# Patient Record
Sex: FEMALE | Race: WHITE | Employment: UNEMPLOYED | URBAN - NONMETROPOLITAN AREA
[De-identification: names, ages, dates, MRNs, and addresses within clinical notes are randomized per-mention and may not be internally consistent; named-entity substitution may affect disease eponyms.]

---

## 2017-06-27 ENCOUNTER — HOSPITAL ENCOUNTER (OUTPATIENT)
Dept: PAIN MANAGEMENT | Age: 37
Discharge: HOME | End: 2017-06-27
Payer: MEDICAID

## 2017-06-27 VITALS
WEIGHT: 174 LBS | RESPIRATION RATE: 18 BRPM | HEIGHT: 71 IN | TEMPERATURE: 98.5 F | BODY MASS INDEX: 24.36 KG/M2 | OXYGEN SATURATION: 95 % | HEART RATE: 73 BPM | DIASTOLIC BLOOD PRESSURE: 76 MMHG | SYSTOLIC BLOOD PRESSURE: 104 MMHG

## 2017-06-27 PROCEDURE — 99205 OFFICE O/P NEW HI 60 MIN: CPT

## 2017-06-27 RX ORDER — MELOXICAM 7.5 MG/1
7.5 TABLET ORAL DAILY
Qty: 30 TABLET | Refills: 2 | Status: SHIPPED | OUTPATIENT
Start: 2017-06-27 | End: 2017-08-30 | Stop reason: SDUPTHER

## 2017-06-27 RX ORDER — HYDROCODONE BITARTRATE AND ACETAMINOPHEN 10; 325 MG/1; MG/1
1 TABLET ORAL 3 TIMES DAILY PRN
Refills: 0 | COMMUNITY
Start: 2017-06-06 | End: 2017-06-27 | Stop reason: SDUPTHER

## 2017-06-27 RX ORDER — PROMETHAZINE HYDROCHLORIDE 25 MG/1
25 TABLET ORAL EVERY 6 HOURS PRN
COMMUNITY

## 2017-06-27 RX ORDER — OXCARBAZEPINE 300 MG/1
300 TABLET, FILM COATED ORAL 2 TIMES DAILY
COMMUNITY
End: 2020-03-07

## 2017-06-27 RX ORDER — ONDANSETRON 4 MG/1
4 TABLET, ORALLY DISINTEGRATING ORAL PRN
COMMUNITY
End: 2020-03-07

## 2017-06-27 RX ORDER — ESTRADIOL 1 MG/1
1 TABLET ORAL DAILY
COMMUNITY

## 2017-06-27 RX ORDER — ZIPRASIDONE HYDROCHLORIDE 40 MG/1
40 CAPSULE ORAL 2 TIMES DAILY WITH MEALS
COMMUNITY
End: 2020-03-07

## 2017-06-27 RX ORDER — HYDROCODONE BITARTRATE AND ACETAMINOPHEN 10; 325 MG/1; MG/1
1 TABLET ORAL 3 TIMES DAILY PRN
Qty: 90 TABLET | Refills: 0 | Status: SHIPPED | OUTPATIENT
Start: 2017-07-06 | End: 2017-08-03 | Stop reason: SDUPTHER

## 2017-06-27 RX ORDER — CYCLOBENZAPRINE HCL 10 MG
10 TABLET ORAL 2 TIMES DAILY PRN
COMMUNITY
End: 2020-03-07

## 2017-06-27 ASSESSMENT — PAIN SCALES - GENERAL: PAINLEVEL_OUTOF10: 7

## 2017-06-27 ASSESSMENT — PAIN DESCRIPTION - FREQUENCY: FREQUENCY: CONTINUOUS

## 2017-06-27 ASSESSMENT — PAIN DESCRIPTION - PROGRESSION: CLINICAL_PROGRESSION: NOT CHANGED

## 2017-06-27 ASSESSMENT — PAIN DESCRIPTION - DIRECTION: RADIATING_TOWARDS: RIGHT SHOULDER

## 2017-06-27 ASSESSMENT — PAIN DESCRIPTION - ORIENTATION: ORIENTATION: RIGHT

## 2017-06-27 ASSESSMENT — PAIN DESCRIPTION - PAIN TYPE: TYPE: CHRONIC PAIN

## 2017-06-27 ASSESSMENT — PAIN DESCRIPTION - ONSET: ONSET: ON-GOING

## 2017-06-27 ASSESSMENT — PAIN DESCRIPTION - LOCATION: LOCATION: NECK;SHOULDER

## 2017-08-03 RX ORDER — HYDROCODONE BITARTRATE AND ACETAMINOPHEN 10; 325 MG/1; MG/1
1 TABLET ORAL 3 TIMES DAILY PRN
Qty: 90 TABLET | Refills: 0 | Status: SHIPPED | OUTPATIENT
Start: 2017-08-05 | End: 2017-08-30 | Stop reason: SDUPTHER

## 2017-08-30 ENCOUNTER — HOSPITAL ENCOUNTER (OUTPATIENT)
Dept: PAIN MANAGEMENT | Age: 37
Discharge: HOME | End: 2017-08-30
Payer: MEDICAID

## 2017-08-30 VITALS
BODY MASS INDEX: 25.06 KG/M2 | TEMPERATURE: 98.2 F | OXYGEN SATURATION: 99 % | SYSTOLIC BLOOD PRESSURE: 99 MMHG | RESPIRATION RATE: 18 BRPM | HEIGHT: 71 IN | DIASTOLIC BLOOD PRESSURE: 67 MMHG | HEART RATE: 77 BPM | WEIGHT: 179 LBS

## 2017-08-30 PROCEDURE — 80307 DRUG TEST PRSMV CHEM ANLYZR: CPT

## 2017-08-30 PROCEDURE — 99214 OFFICE O/P EST MOD 30 MIN: CPT

## 2017-08-30 RX ORDER — MELOXICAM 7.5 MG/1
7.5 TABLET ORAL DAILY
Qty: 30 TABLET | Refills: 2 | Status: SHIPPED | OUTPATIENT
Start: 2017-08-30 | End: 2017-11-29 | Stop reason: SDUPTHER

## 2017-08-30 RX ORDER — HYDROCODONE BITARTRATE AND ACETAMINOPHEN 10; 325 MG/1; MG/1
1 TABLET ORAL 3 TIMES DAILY PRN
Qty: 90 TABLET | Refills: 0 | Status: SHIPPED | OUTPATIENT
Start: 2017-09-06 | End: 2017-09-20 | Stop reason: SDUPTHER

## 2017-08-30 ASSESSMENT — PAIN DESCRIPTION - FREQUENCY: FREQUENCY: CONTINUOUS

## 2017-08-30 ASSESSMENT — PAIN SCALES - GENERAL: PAINLEVEL_OUTOF10: 6

## 2017-08-30 ASSESSMENT — PAIN DESCRIPTION - PROGRESSION: CLINICAL_PROGRESSION: GRADUALLY WORSENING

## 2017-08-30 ASSESSMENT — PAIN DESCRIPTION - PAIN TYPE: TYPE: CHRONIC PAIN

## 2017-08-30 ASSESSMENT — ACTIVITIES OF DAILY LIVING (ADL): EFFECT OF PAIN ON DAILY ACTIVITIES: LIMITS ACTIVITIES

## 2017-08-30 ASSESSMENT — PAIN DESCRIPTION - ORIENTATION: ORIENTATION: LOWER

## 2017-08-30 ASSESSMENT — PAIN DESCRIPTION - ONSET: ONSET: ON-GOING

## 2017-08-30 ASSESSMENT — PAIN DESCRIPTION - LOCATION: LOCATION: NECK;HEAD

## 2017-09-07 LAB
AMPHETAMINES, URINE: NEGATIVE NG/ML
BARBITURATES, URINE: NEGATIVE NG/ML
BENZODIAZEPINES, URINE: NEGATIVE NG/ML
CANNABINOIDS, URINE: NEGATIVE NG/ML
COCAINE METABOLITE, URINE: NEGATIVE NG/ML
CODEINE, URINE: NEGATIVE
CREATININE, URINE: 63.9 MG/DL (ref 20–300)
ETHANOL U, QUAN: NEGATIVE %
FENTANYL URINE: NEGATIVE PG/ML
HYDROCODONE, UR CONF: 416 NG/ML
HYDROCODONE, URINE: POSITIVE
HYDROMORPHONE, URINE: NEGATIVE
MEPERIDINE, UR: NEGATIVE NG/ML
METHADONE SCREEN, URINE: NEGATIVE NG/ML
MORPHINE URINE: NEGATIVE
OPIATES, URINE: ABNORMAL NG/ML
OPIATES, URINE: POSITIVE NG/ML
OXYCODONE/OXYMORPHONE, UR: NEGATIVE NG/ML
PH, URINE: 6 (ref 4.5–8.9)
PHENCYCLIDINE, URINE: NEGATIVE NG/ML
PROPOXYPHENE, URINE: NEGATIVE NG/ML

## 2017-09-20 RX ORDER — HYDROCODONE BITARTRATE AND ACETAMINOPHEN 10; 325 MG/1; MG/1
1 TABLET ORAL 3 TIMES DAILY PRN
Qty: 90 TABLET | Refills: 0 | Status: SHIPPED | OUTPATIENT
Start: 2017-09-23 | End: 2017-10-04 | Stop reason: SDUPTHER

## 2017-10-04 ENCOUNTER — HOSPITAL ENCOUNTER (OUTPATIENT)
Dept: PAIN MANAGEMENT | Age: 37
Discharge: HOME | End: 2017-10-04
Payer: MEDICAID

## 2017-10-04 VITALS
OXYGEN SATURATION: 98 % | BODY MASS INDEX: 26.04 KG/M2 | DIASTOLIC BLOOD PRESSURE: 47 MMHG | HEIGHT: 71 IN | TEMPERATURE: 97.7 F | WEIGHT: 186 LBS | HEART RATE: 76 BPM | SYSTOLIC BLOOD PRESSURE: 95 MMHG | RESPIRATION RATE: 20 BRPM

## 2017-10-04 VITALS — DIASTOLIC BLOOD PRESSURE: 59 MMHG | SYSTOLIC BLOOD PRESSURE: 100 MMHG | OXYGEN SATURATION: 99 % | HEART RATE: 69 BPM

## 2017-10-04 DIAGNOSIS — M54.2 NECK PAIN: ICD-10-CM

## 2017-10-04 DIAGNOSIS — M47.812 CERVICAL FACET JOINT SYNDROME: Chronic | ICD-10-CM

## 2017-10-04 PROCEDURE — 64492 INJ PARAVERT F JNT C/T 3 LEV: CPT

## 2017-10-04 PROCEDURE — 64490 INJ PARAVERT F JNT C/T 1 LEV: CPT

## 2017-10-04 PROCEDURE — 80307 DRUG TEST PRSMV CHEM ANLYZR: CPT

## 2017-10-04 PROCEDURE — 99152 MOD SED SAME PHYS/QHP 5/>YRS: CPT

## 2017-10-04 PROCEDURE — 64491 INJ PARAVERT F JNT C/T 2 LEV: CPT

## 2017-10-04 PROCEDURE — 3209999900 FLUORO FOR SURGICAL PROCEDURES

## 2017-10-04 PROCEDURE — 6360000002 HC RX W HCPCS

## 2017-10-04 PROCEDURE — 2500000003 HC RX 250 WO HCPCS

## 2017-10-04 RX ORDER — TRIAMCINOLONE ACETONIDE 40 MG/ML
INJECTION, SUSPENSION INTRA-ARTICULAR; INTRAMUSCULAR
Status: COMPLETED | OUTPATIENT
Start: 2017-10-04 | End: 2017-10-04

## 2017-10-04 RX ORDER — HYDROCODONE BITARTRATE AND ACETAMINOPHEN 10; 325 MG/1; MG/1
1 TABLET ORAL 3 TIMES DAILY PRN
Qty: 90 TABLET | Refills: 0 | Status: SHIPPED | OUTPATIENT
Start: 2017-10-23 | End: 2017-10-04 | Stop reason: SDUPTHER

## 2017-10-04 RX ORDER — MIDAZOLAM HYDROCHLORIDE 1 MG/ML
INJECTION INTRAMUSCULAR; INTRAVENOUS
Status: COMPLETED | OUTPATIENT
Start: 2017-10-04 | End: 2017-10-04

## 2017-10-04 RX ORDER — HYDROCODONE BITARTRATE AND ACETAMINOPHEN 10; 325 MG/1; MG/1
1 TABLET ORAL 2 TIMES DAILY PRN
Qty: 60 TABLET | Refills: 0 | Status: SHIPPED | OUTPATIENT
Start: 2017-10-23 | End: 2017-11-20 | Stop reason: SDUPTHER

## 2017-10-04 RX ORDER — BUPIVACAINE HYDROCHLORIDE 5 MG/ML
INJECTION, SOLUTION EPIDURAL; INTRACAUDAL
Status: COMPLETED | OUTPATIENT
Start: 2017-10-04 | End: 2017-10-04

## 2017-10-04 RX ADMIN — TRIAMCINOLONE ACETONIDE 40 MG: 40 INJECTION, SUSPENSION INTRA-ARTICULAR; INTRAMUSCULAR at 09:02

## 2017-10-04 RX ADMIN — BUPIVACAINE HYDROCHLORIDE 9 ML: 5 INJECTION, SOLUTION EPIDURAL; INTRACAUDAL at 09:02

## 2017-10-04 RX ADMIN — MIDAZOLAM HYDROCHLORIDE 2 MG: 1 INJECTION INTRAMUSCULAR; INTRAVENOUS at 09:02

## 2017-10-04 ASSESSMENT — ACTIVITIES OF DAILY LIVING (ADL): EFFECT OF PAIN ON DAILY ACTIVITIES: DAILY CHORES AND ACTIVITIES

## 2017-10-04 ASSESSMENT — PAIN - FUNCTIONAL ASSESSMENT
PAIN_FUNCTIONAL_ASSESSMENT: 0-10
PAIN_FUNCTIONAL_ASSESSMENT: 0-10

## 2017-10-04 NOTE — PROCEDURES
[x]Seems Interested               []Seems Uninterested                  [x]Denies need for Education  Risk for Injury:  [x]Patient oriented to person, place and time  []History of frequent falls/loss of balance  Nutritional:  []Change in appetite   []Weight Gain   []Weight Loss  Functional             Nursing Admission Record  Current Issues / Falls / ER Visits:  No   Percentage of Pain Relief after Last Procedure:  na %    How long lasted:  0 days   Radiology exams received during the last 12 months: Yes       When  july                                              Where [facility]       Imaging on chart: Yes         Imaging records requested: No  MRI exams received in the past 2 years:  Yes  Physical therapy during the last 6 months: Yes       When:  june                                             [facility]  Labs during the last 12 months: Yes        COMMENTS:  Pt c/o right sided neck pain that radiates down the right shoulder. Pt had an MRI that was reviewed per [facility] and with the patient. Pt has seen Mychal Quale in the past and is not a candidate for surgery at this time. Pt is a smoker. Explained to the patient the negative effects of smoking on pain scores. Pt is currently not working due to the increased pain in her neck. Also explained to the patient that taking a daily narcotic has negative effects on the patient's pain score. Encouraged patient to not take a daily narcotic. Pain score today is 8/10. Will continue with the right cervical facets with sedation today. Discussed the risks and complications with the patient. Will wean narcotics and restart PT. PLAN:  [x] Will return to office in 1 month(s) for  [] Planned Procedure [] Office Visit  [x] Prescriptions were given today  [] No prescriptions needed today  [x] Patient is to call with any questions or concerns which may arise prior to the next office visit.   [x] Cervical Facets with sedation  []         Luann Nuñez MD [facility]            [x] Over 50% of today's appointment was given to discussion, evaluation and counseling.

## 2017-10-04 NOTE — PROCEDURES
Patient Name: Mukul Singh  : 1980  MRN: [id number]    PRE-SEDATION ASSESSMENT    Procedure:  [unfilled]  I have examined the patient's status immediately prior to the procedure. BRIEF H&P    HPI/Changes/Indicators/Diagnosis  Active Hospital Problems    Diagnosis Date Noted    Cervical facet joint syndrome [M12.88] 10/04/2017       Medications:  Prior to Admission medications    Medication Sig Start Date End Date Taking? Authorizing Provider   HYDROcodone-acetaminophen (NORCO)  MG per tablet Take 1 tablet by mouth 2 times daily as needed for Pain . Earliest Fill Date: 10/23/17 10/23/17   China Tarango MD   meloxicam (MOBIC) 7.5 MG tablet Take 1 tablet by mouth daily 17   BOB Hollingsworth   cyclobenzaprine (FLEXERIL) 10 MG tablet Take 10 mg by mouth 2 times daily as needed for Muscle spasms    Historical Provider, MD   estradiol (ESTRACE) 1 MG tablet Take 1 mg by mouth daily    Historical Provider, MD   ondansetron (ZOFRAN-ODT) 4 MG disintegrating tablet Take 4 mg by mouth as needed for Nausea or Vomiting    Historical Provider, MD   OXcarbazepine (TRILEPTAL) 300 MG tablet Take 300 mg by mouth 2 times daily    Historical Provider, MD   promethazine (PHENERGAN) 25 MG tablet Take 25 mg by mouth every 6 hours as needed for Nausea    Historical Provider, MD   ziprasidone (GEODON) 40 MG capsule Take 40 mg by mouth 2 times daily (with meals) After dinner daily    Historical Provider, MD       Allergies:  has No Known Allergies.     Vital Signs:  Vitals:    10/04/17 0903   BP: (!) 95/47   Pulse: 76   Resp: 20   Temp:    SpO2: 98%       Physical Exam:  Cardiac:                                    []WNL                    []Comments:    Pulmonary:                               []WNL                    []Comments:    Neuro/Mental Status:               []WNL                    []Comments:      Informed Consent:  The risks and benefits of the procedure have been discussed with either the patient or if they

## 2017-10-04 NOTE — PROGRESS NOTES
office  Labs during the last 12 months: Yes    Education Provided:  [x] Review of Eladio [facility]  [] Agreement Review  [] Compliance Issues Discussed    [] Cognitive Behavior Needs [x] Exercise [] Review of Test [] Financial Issues  [x] Tobacco/Alcohol Use [x] Teaching [] New Patient [] Picture Obtained    Physician Plan:  [] Outgoing Referral  [] Pharmacy Consult  [x] Test Ordered   [] Obtained Test Results / Consult Notes  [] UDS due at next visit, verified per EPIC      [] Suspected Physical Abuse or Suicide Risk assessed - IF YES COMPLETE QUESTIONS BELOW    If any of the following questions are answered yes - contact attending physician for referral:    Has been considering harming self to escape stress, pain problems? [] YES  [x] NO  Has a suicide plan? [] YES  [x] NO  Has attempted suicide in the past?   [] YES  [x] NO  Has a close friend or family member who committed suicide? [] YES  [x] NO    Patient Referred To :      Additional Notes:    Assessment Completed by:  Electronically signed by Gabriela Ba RN on 10/4/2017 at 8:31 AM

## 2017-10-09 LAB
AMPHETAMINES, URINE: NEGATIVE NG/ML
BARBITURATES, URINE: NEGATIVE NG/ML
BENZODIAZEPINES, URINE: NEGATIVE NG/ML
CANNABINOIDS, URINE: NEGATIVE NG/ML
COCAINE METABOLITE, URINE: NEGATIVE NG/ML
CODEINE, URINE: NEGATIVE
CREATININE, URINE: 39.3 MG/DL (ref 20–300)
ETHANOL U, QUAN: NEGATIVE %
FENTANYL URINE: NEGATIVE PG/ML
HYDROCODONE, UR CONF: 2180 NG/ML
HYDROCODONE, URINE: POSITIVE
HYDROMORPHONE, URINE: NEGATIVE
MEPERIDINE, UR: NEGATIVE NG/ML
METHADONE SCREEN, URINE: NEGATIVE NG/ML
MORPHINE URINE: NEGATIVE
OPIATES, URINE: ABNORMAL NG/ML
OPIATES, URINE: POSITIVE NG/ML
OXYCODONE/OXYMORPHONE, UR: NEGATIVE NG/ML
PH, URINE: 5.7 (ref 4.5–8.9)
PHENCYCLIDINE, URINE: NEGATIVE NG/ML
PROPOXYPHENE, URINE: NEGATIVE NG/ML

## 2017-11-20 RX ORDER — HYDROCODONE BITARTRATE AND ACETAMINOPHEN 10; 325 MG/1; MG/1
1 TABLET ORAL 2 TIMES DAILY PRN
Qty: 60 TABLET | Refills: 0 | Status: SHIPPED | OUTPATIENT
Start: 2017-11-22 | End: 2017-12-18 | Stop reason: SDUPTHER

## 2017-11-30 RX ORDER — MELOXICAM 7.5 MG/1
7.5 TABLET ORAL DAILY
Qty: 30 TABLET | Refills: 2 | Status: SHIPPED | OUTPATIENT
Start: 2017-11-30 | End: 2018-02-28 | Stop reason: SDUPTHER

## 2017-12-18 RX ORDER — HYDROCODONE BITARTRATE AND ACETAMINOPHEN 10; 325 MG/1; MG/1
1 TABLET ORAL 2 TIMES DAILY PRN
Qty: 60 TABLET | Refills: 0 | Status: SHIPPED | OUTPATIENT
Start: 2017-12-22 | End: 2018-01-18 | Stop reason: SDUPTHER

## 2018-01-18 RX ORDER — HYDROCODONE BITARTRATE AND ACETAMINOPHEN 10; 325 MG/1; MG/1
1 TABLET ORAL 2 TIMES DAILY PRN
Qty: 60 TABLET | Refills: 0 | Status: SHIPPED | OUTPATIENT
Start: 2018-01-21 | End: 2018-02-20

## 2018-02-19 RX ORDER — HYDROCODONE BITARTRATE AND ACETAMINOPHEN 10; 325 MG/1; MG/1
1 TABLET ORAL 2 TIMES DAILY PRN
Qty: 6 TABLET | Refills: 0 | Status: SHIPPED | OUTPATIENT
Start: 2018-02-20 | End: 2018-02-23 | Stop reason: SDUPTHER

## 2018-02-23 ENCOUNTER — HOSPITAL ENCOUNTER (OUTPATIENT)
Dept: PAIN MANAGEMENT | Age: 38
Discharge: HOME | End: 2018-02-23
Payer: MEDICAID

## 2018-02-23 VITALS
SYSTOLIC BLOOD PRESSURE: 122 MMHG | HEART RATE: 70 BPM | OXYGEN SATURATION: 99 % | TEMPERATURE: 97.2 F | DIASTOLIC BLOOD PRESSURE: 82 MMHG | BODY MASS INDEX: 26.88 KG/M2 | HEIGHT: 71 IN | RESPIRATION RATE: 18 BRPM | WEIGHT: 192 LBS

## 2018-02-23 DIAGNOSIS — M47.812 CERVICAL FACET JOINT SYNDROME: ICD-10-CM

## 2018-02-23 PROCEDURE — 99213 OFFICE O/P EST LOW 20 MIN: CPT

## 2018-02-23 RX ORDER — HYDROCODONE BITARTRATE AND ACETAMINOPHEN 10; 325 MG/1; MG/1
1 TABLET ORAL 2 TIMES DAILY PRN
Qty: 60 TABLET | Refills: 0 | Status: SHIPPED | OUTPATIENT
Start: 2018-02-23 | End: 2018-03-19 | Stop reason: SDUPTHER

## 2018-02-23 ASSESSMENT — PAIN DESCRIPTION - FREQUENCY: FREQUENCY: CONTINUOUS

## 2018-02-23 ASSESSMENT — PAIN DESCRIPTION - PAIN TYPE: TYPE: CHRONIC PAIN

## 2018-02-23 ASSESSMENT — PAIN DESCRIPTION - ONSET: ONSET: ON-GOING

## 2018-02-23 ASSESSMENT — ACTIVITIES OF DAILY LIVING (ADL): EFFECT OF PAIN ON DAILY ACTIVITIES: LIMITS ACTIVITIES

## 2018-02-23 ASSESSMENT — PAIN DESCRIPTION - PROGRESSION: CLINICAL_PROGRESSION: NOT CHANGED

## 2018-02-23 ASSESSMENT — PAIN DESCRIPTION - DESCRIPTORS: DESCRIPTORS: ACHING;RADIATING;CONSTANT

## 2018-02-23 ASSESSMENT — PAIN SCALES - GENERAL: PAINLEVEL_OUTOF10: 7

## 2018-02-23 ASSESSMENT — PAIN DESCRIPTION - ORIENTATION: ORIENTATION: LOWER;RIGHT

## 2018-02-23 ASSESSMENT — PAIN DESCRIPTION - LOCATION: LOCATION: NECK
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
## 2018-03-01 RX ORDER — MELOXICAM 7.5 MG/1
7.5 TABLET ORAL DAILY
Qty: 30 TABLET | Refills: 2 | Status: SHIPPED | OUTPATIENT
Start: 2018-03-01 | End: 2020-03-07

## 2018-03-21 RX ORDER — HYDROCODONE BITARTRATE AND ACETAMINOPHEN 10; 325 MG/1; MG/1
1 TABLET ORAL 2 TIMES DAILY PRN
Qty: 60 TABLET | Refills: 0 | Status: SHIPPED | OUTPATIENT
Start: 2018-03-25 | End: 2018-04-24 | Stop reason: SDUPTHER

## 2018-03-30 ENCOUNTER — HOSPITAL ENCOUNTER (OUTPATIENT)
Dept: PAIN MANAGEMENT | Age: 38
Discharge: HOME | End: 2018-03-30
Payer: MEDICAID

## 2018-03-30 VITALS
BODY MASS INDEX: 26.74 KG/M2 | SYSTOLIC BLOOD PRESSURE: 117 MMHG | TEMPERATURE: 97.6 F | RESPIRATION RATE: 16 BRPM | OXYGEN SATURATION: 99 % | HEIGHT: 71 IN | HEART RATE: 66 BPM | DIASTOLIC BLOOD PRESSURE: 65 MMHG | WEIGHT: 191 LBS

## 2018-03-30 DIAGNOSIS — M47.812 CERVICAL FACET JOINT SYNDROME: ICD-10-CM

## 2018-03-30 PROCEDURE — 6360000002 HC RX W HCPCS

## 2018-03-30 PROCEDURE — 64490 INJ PARAVERT F JNT C/T 1 LEV: CPT

## 2018-03-30 PROCEDURE — 99152 MOD SED SAME PHYS/QHP 5/>YRS: CPT

## 2018-03-30 PROCEDURE — 3209999900 FLUORO FOR SURGICAL PROCEDURES

## 2018-03-30 PROCEDURE — 64491 INJ PARAVERT F JNT C/T 2 LEV: CPT

## 2018-03-30 PROCEDURE — 64492 INJ PARAVERT F JNT C/T 3 LEV: CPT

## 2018-03-30 PROCEDURE — 64493 INJ PARAVERT F JNT L/S 1 LEV: CPT

## 2018-03-30 PROCEDURE — 2500000003 HC RX 250 WO HCPCS

## 2018-03-30 RX ORDER — BUPIVACAINE HYDROCHLORIDE 5 MG/ML
INJECTION, SOLUTION EPIDURAL; INTRACAUDAL
Status: COMPLETED | OUTPATIENT
Start: 2018-03-30 | End: 2018-03-30

## 2018-03-30 RX ORDER — TRIAMCINOLONE ACETONIDE 40 MG/ML
INJECTION, SUSPENSION INTRA-ARTICULAR; INTRAMUSCULAR
Status: COMPLETED | OUTPATIENT
Start: 2018-03-30 | End: 2018-03-30

## 2018-03-30 RX ORDER — MIDAZOLAM HYDROCHLORIDE 1 MG/ML
INJECTION INTRAMUSCULAR; INTRAVENOUS
Status: COMPLETED | OUTPATIENT
Start: 2018-03-30 | End: 2018-03-30

## 2018-03-30 RX ADMIN — BUPIVACAINE HYDROCHLORIDE 9 ML: 5 INJECTION, SOLUTION EPIDURAL; INTRACAUDAL at 09:08

## 2018-03-30 RX ADMIN — TRIAMCINOLONE ACETONIDE 40 MG: 40 INJECTION, SUSPENSION INTRA-ARTICULAR; INTRAMUSCULAR at 09:09

## 2018-03-30 RX ADMIN — MIDAZOLAM HYDROCHLORIDE 2 MG: 1 INJECTION INTRAMUSCULAR; INTRAVENOUS at 09:09

## 2018-03-30 ASSESSMENT — ACTIVITIES OF DAILY LIVING (ADL): EFFECT OF PAIN ON DAILY ACTIVITIES: LIMITS ACTIVITY

## 2018-03-30 ASSESSMENT — PAIN DESCRIPTION - DESCRIPTORS: DESCRIPTORS: ACHING;CONSTANT

## 2018-03-30 ASSESSMENT — PAIN - FUNCTIONAL ASSESSMENT: PAIN_FUNCTIONAL_ASSESSMENT: 0-10

## 2018-03-30 NOTE — PROGRESS NOTES
Procedure:  Level of Consciousness: [x]Alert [x]Oriented []Disoriented []Lethargic  Anxiety Level: [x]Calm []Anxious []Depressed []Other  Skin: [x]Warm [x]Dry []Cool []Moist []Intact []Other  Cardiovascular: []Palpitations: [x]Never []Occasionally []Frequently  Chest Pain: [x]No []Yes  Respiratory:  [x]Unlabored []Labored []Cough ([] Productive []Unproductive)  HCG Required: [x]No []Yes   Results: []Negative []Positive  Knowledge Level:        [x]Patient/Other verbalized understanding of pre-procedure instructions. [x]Assessment of post-op care needs (transportation, responsible caregiver)        [x]Able to discuss health care problems and how to deal with it.   Factors that Affect Teaching:        Language Barrier: [x]No []Yes - why:        Hearing Loss:        [x]No []Yes            Corrective Device:  []Yes []No        Vision Loss:           []No [x]Yes            Corrective Device:  [x]Yes []No        Memory Loss:       [x]No []Yes            []Short Term []Long Term  Motivational Level:  [x]Asks Questions                  []Extremely Anxious       [x]Seems Interested               []Seems Uninterested                  []Denies need for Education  Risk for Injury:  [x]Patient oriented to person, place and time  []History of frequent falls/loss of balance  Nutritional:  []Change in appetite   []Weight Gain   []Weight Loss  Functional:  []Requires assistance with ADL's  Nursing Admission Record    Current Issues / Falls / ER Visits:  No    Percentage of Pain Relief after Last Procedure:  70 %    How long lasted:  4 weeks    Radiology exams received during the last 12 months: Yes XR cervical spine       When 7/2017                                              Where [id number] on chart: Yes         Imaging records requested: No  MRI exams received in the past 2 years:  Yes MRI cervical spine 7/2017 @ [facility]  Physical therapy during the last 6 months: No       When: na

## 2018-03-30 NOTE — PROCEDURES
DATE: 3/30/2018      REASON FOR VISIT: Cervical Degenerative Disc Disease, Cervical Facet Syndrome  PROCEDURE:  Cervical Facet Injection   [x] Moderate Sedation    DESCRIPTION OF PROCEDURE:    After obtaining informed consent, the patient was taken to the procedure room, position [x] Left [] Right-lateral decubitus (or) [] Prone, and sterilely prepped. The fluoroscope was positioned and a 22-gauge needle was passed with fluoroscopic guidance to the median nerve branches of the C2-3, 3-4, and 4-5 cervical facet on the [] Left [x] Right. Then 0.5ml of 0.5% Marcaine with 2 mg of Kenalog was injected. This was repeated at *** on the [] Left [] Right. There were no complications. [x] The patient and fluoroscope were repositioned to the [x] Left [] Right and the procedure was repeated identically at C2-3, 3-4, and 4-5. There were no complications. DIAGNOSES:  [x] Cervical Degenerative Disc Disease    [x] *Cervical Facet Syndrome  [] Cervical Spondylosis        [] Other    Nursing Admission Record    Current Issues / Falls / ER Visits:  No    Percentage of Pain Relief after Last Procedure:  70 %    How long lasted:  4 weeks    Radiology exams received during the last 12 months: Yes XR cervical spine       When 7/2017                                              Where [address] on chart: Yes         Imaging records requested: No  MRI exams received in the past 2 years:  Yes MRI cervical spine 7/2017 @ [facility]  Physical therapy during the last 6 months: No       When: na                                             Where  na  Labs during the last 12 months: Yes    COMMENTS:  Patient c/o neck pain that radiates into right arm. Patient rates her pain level a 8/10 on numeric scale today. Patient states she received 70% relief for 4 weeks from last injection. Discussed that proceeding with the Cervical Facets will allow for the patient to identify source of pain.  Discussed that if
[]Seems Uninterested                  []Denies need for Education  Risk for Injury:  [x]Patient oriented to person, place and time  []History of frequent falls/loss of balance  Nutritional:  []Change in appetite   []Weight Gain   []Weight Loss  Functional:    Nursing Admission Record   Current Issues / Falls / ER Visits:  No   Percentage of Pain Relief after Last Procedure:  70 %    How long lasted:  4 weeks   Radiology exams received during the last 12 months: Yes XR cervical spine       When 7/2017                                              Where [id number] on chart: Yes         Imaging records requested: No  MRI exams received in the past 2 years:  Yes MRI cervical spine 7/2017 @ [facility]  Physical therapy during the last 6 months: No    Labs during the last 12 months: Yes      EXAM  AIRWAY ADEQUATE  LUNGS CLEAR AT AUSCULTATION  HEART RRR        COMMENTS:  Patient complains of neck  pain. Patient feels that she gained 70% relief from last injections that lasted her 4 weeks. Patient feels that right side is worse. Patient is daily smoker, informed the patient about the detrimental effects of smoking on pain perception. Patient feels that her mood is good today, but states that she has her ups and downs. Discussed that proceeding with the Cervical  Facets will allow for the patient to identify source of pain. Discussed that if successful with 2 series of Cervical  facet injections will proceed with Radiofrequency Lesioning of the Cervical  Facets that can provide  up to 6 months of relief . Discussed the risk and benefits of procedure with patient. Will proceed today with Cervical Facet injections. PLAN:  [x] Will return to office in 1 month(s) for [] Planned Procedure [x] Office Visit  [] Prescriptions were given today    [] No prescriptions needed today  [] Patient is to call with any questions or concerns which may arise prior to the next office visit.   [] Cervical Facets

## 2018-04-24 RX ORDER — HYDROCODONE BITARTRATE AND ACETAMINOPHEN 10; 325 MG/1; MG/1
1 TABLET ORAL 2 TIMES DAILY PRN
Qty: 60 TABLET | Refills: 0 | Status: SHIPPED | OUTPATIENT
Start: 2018-04-25 | End: 2018-05-23 | Stop reason: SDUPTHER

## 2018-05-23 RX ORDER — HYDROCODONE BITARTRATE AND ACETAMINOPHEN 10; 325 MG/1; MG/1
1 TABLET ORAL 2 TIMES DAILY PRN
Qty: 60 TABLET | Refills: 0 | Status: SHIPPED | OUTPATIENT
Start: 2018-05-25 | End: 2018-06-25 | Stop reason: SDUPTHER

## 2018-06-25 DIAGNOSIS — M47.812 CERVICAL FACET JOINT SYNDROME: Primary | ICD-10-CM

## 2018-06-26 RX ORDER — HYDROCODONE BITARTRATE AND ACETAMINOPHEN 10; 325 MG/1; MG/1
1 TABLET ORAL 2 TIMES DAILY PRN
Qty: 60 TABLET | Refills: 0 | Status: SHIPPED | OUTPATIENT
Start: 2018-06-27 | End: 2020-03-07

## 2020-03-07 ENCOUNTER — HOSPITAL ENCOUNTER (INPATIENT)
Age: 40
LOS: 2 days | Discharge: HOME | DRG: 885 | End: 2020-03-09
Attending: EMERGENCY MEDICINE | Admitting: PSYCHIATRY & NEUROLOGY
Payer: MEDICAID

## 2020-03-07 LAB
ALBUMIN SERPL-MCNC: 4.6 G/DL (ref 3.5–5.2)
ALP BLD-CCNC: 77 U/L (ref 35–104)
ALT SERPL-CCNC: 16 U/L (ref 5–33)
AMPHETAMINE SCREEN, URINE: NEGATIVE
ANION GAP SERPL CALCULATED.3IONS-SCNC: 12 MMOL/L (ref 7–19)
AST SERPL-CCNC: 27 U/L (ref 5–32)
BARBITURATE SCREEN URINE: NEGATIVE
BASOPHILS ABSOLUTE: 0.1 K/UL (ref 0–0.2)
BASOPHILS RELATIVE PERCENT: 0.6 % (ref 0–1)
BENZODIAZEPINE SCREEN, URINE: NEGATIVE
BILIRUB SERPL-MCNC: <0.2 MG/DL (ref 0.2–1.2)
BILIRUBIN URINE: NEGATIVE
BLOOD, URINE: NEGATIVE
BUN BLDV-MCNC: 13 MG/DL (ref 6–20)
CALCIUM SERPL-MCNC: 9.5 MG/DL (ref 8.6–10)
CANNABINOID SCREEN URINE: NEGATIVE
CHLORIDE BLD-SCNC: 99 MMOL/L (ref 98–111)
CLARITY: CLEAR
CO2: 23 MMOL/L (ref 22–29)
COCAINE METABOLITE SCREEN URINE: NEGATIVE
COLOR: YELLOW
CREAT SERPL-MCNC: 0.7 MG/DL (ref 0.5–0.9)
EOSINOPHILS ABSOLUTE: 0.1 K/UL (ref 0–0.6)
EOSINOPHILS RELATIVE PERCENT: 1.3 % (ref 0–5)
ETHANOL: <10 MG/DL (ref 0–0.08)
GFR NON-AFRICAN AMERICAN: >60
GLUCOSE BLD-MCNC: 85 MG/DL (ref 74–109)
GLUCOSE URINE: NEGATIVE MG/DL
HCT VFR BLD CALC: 49.7 % (ref 37–47)
HEMOGLOBIN: 16 G/DL (ref 12–16)
IMMATURE GRANULOCYTES #: 0 K/UL
KETONES, URINE: NEGATIVE MG/DL
LEUKOCYTE ESTERASE, URINE: NEGATIVE
LYMPHOCYTES ABSOLUTE: 2.9 K/UL (ref 1.1–4.5)
LYMPHOCYTES RELATIVE PERCENT: 31.7 % (ref 20–40)
Lab: NORMAL
MCH RBC QN AUTO: 33.7 PG (ref 27–31)
MCHC RBC AUTO-ENTMCNC: 32.2 G/DL (ref 33–37)
MCV RBC AUTO: 104.6 FL (ref 81–99)
MONOCYTES ABSOLUTE: 0.6 K/UL (ref 0–0.9)
MONOCYTES RELATIVE PERCENT: 6.3 % (ref 0–10)
NEUTROPHILS ABSOLUTE: 5.4 K/UL (ref 1.5–7.5)
NEUTROPHILS RELATIVE PERCENT: 59.9 % (ref 50–65)
NITRITE, URINE: NEGATIVE
OPIATE SCREEN URINE: NEGATIVE
PDW BLD-RTO: 12.5 % (ref 11.5–14.5)
PH UA: 6.5 (ref 5–8)
PLATELET # BLD: 272 K/UL (ref 130–400)
PMV BLD AUTO: 9.7 FL (ref 9.4–12.3)
POTASSIUM SERPL-SCNC: 4 MMOL/L (ref 3.5–5)
PROTEIN UA: NEGATIVE MG/DL
RBC # BLD: 4.75 M/UL (ref 4.2–5.4)
SODIUM BLD-SCNC: 134 MMOL/L (ref 136–145)
SPECIFIC GRAVITY UA: 1.01 (ref 1–1.03)
TOTAL PROTEIN: 7.8 G/DL (ref 6.6–8.7)
URINE REFLEX TO CULTURE: NORMAL
UROBILINOGEN, URINE: 0.2 E.U./DL
WBC # BLD: 9.1 K/UL (ref 4.8–10.8)

## 2020-03-07 PROCEDURE — 6370000000 HC RX 637 (ALT 250 FOR IP): Performed by: PSYCHIATRY & NEUROLOGY

## 2020-03-07 PROCEDURE — G0480 DRUG TEST DEF 1-7 CLASSES: HCPCS

## 2020-03-07 PROCEDURE — 81003 URINALYSIS AUTO W/O SCOPE: CPT

## 2020-03-07 PROCEDURE — 99285 EMERGENCY DEPT VISIT HI MDM: CPT

## 2020-03-07 PROCEDURE — 80053 COMPREHEN METABOLIC PANEL: CPT

## 2020-03-07 PROCEDURE — 85025 COMPLETE CBC W/AUTO DIFF WBC: CPT

## 2020-03-07 PROCEDURE — 80307 DRUG TEST PRSMV CHEM ANLYZR: CPT

## 2020-03-07 PROCEDURE — 1240000000 HC EMOTIONAL WELLNESS R&B

## 2020-03-07 PROCEDURE — 36415 COLL VENOUS BLD VENIPUNCTURE: CPT

## 2020-03-07 RX ORDER — ZIPRASIDONE HYDROCHLORIDE 80 MG/1
80 CAPSULE ORAL NIGHTLY
COMMUNITY

## 2020-03-07 RX ORDER — CITALOPRAM HYDROBROMIDE 20 MG/10ML
20 SOLUTION, ORAL ORAL DAILY
Status: DISCONTINUED | OUTPATIENT
Start: 2020-03-08 | End: 2020-03-08

## 2020-03-07 RX ORDER — CLONAZEPAM 1 MG/1
1 TABLET ORAL 2 TIMES DAILY PRN
Status: ON HOLD | COMMUNITY
End: 2020-03-09 | Stop reason: HOSPADM

## 2020-03-07 RX ORDER — POLYETHYLENE GLYCOL 3350 17 G/17G
17 POWDER, FOR SOLUTION ORAL DAILY PRN
Status: DISCONTINUED | OUTPATIENT
Start: 2020-03-07 | End: 2020-03-09 | Stop reason: HOSPADM

## 2020-03-07 RX ORDER — DOXEPIN HYDROCHLORIDE 25 MG/1
25 CAPSULE ORAL NIGHTLY
COMMUNITY

## 2020-03-07 RX ORDER — GABAPENTIN 300 MG/1
300 CAPSULE ORAL 3 TIMES DAILY
Status: DISCONTINUED | OUTPATIENT
Start: 2020-03-07 | End: 2020-03-09 | Stop reason: HOSPADM

## 2020-03-07 RX ORDER — LANOLIN ALCOHOL/MO/W.PET/CERES
3 CREAM (GRAM) TOPICAL NIGHTLY PRN
Status: DISCONTINUED | OUTPATIENT
Start: 2020-03-07 | End: 2020-03-08

## 2020-03-07 RX ORDER — CITALOPRAM 20 MG/1
TABLET ORAL
COMMUNITY

## 2020-03-07 RX ORDER — OMEPRAZOLE 20 MG/1
20 CAPSULE, DELAYED RELEASE ORAL DAILY
COMMUNITY

## 2020-03-07 RX ORDER — ZOLPIDEM TARTRATE 10 MG/1
TABLET ORAL NIGHTLY PRN
COMMUNITY

## 2020-03-07 RX ORDER — ARIPIPRAZOLE 5 MG/1
5 TABLET ORAL DAILY
COMMUNITY

## 2020-03-07 RX ORDER — ARIPIPRAZOLE 5 MG/1
5 TABLET ORAL DAILY
Status: DISCONTINUED | OUTPATIENT
Start: 2020-03-08 | End: 2020-03-09 | Stop reason: HOSPADM

## 2020-03-07 RX ORDER — HYDROXYZINE HYDROCHLORIDE 25 MG/1
25 TABLET, FILM COATED ORAL EVERY 4 HOURS PRN
Status: DISCONTINUED | OUTPATIENT
Start: 2020-03-07 | End: 2020-03-09 | Stop reason: HOSPADM

## 2020-03-07 RX ORDER — ACETAMINOPHEN 325 MG/1
650 TABLET ORAL EVERY 4 HOURS PRN
Status: DISCONTINUED | OUTPATIENT
Start: 2020-03-07 | End: 2020-03-09 | Stop reason: HOSPADM

## 2020-03-07 RX ORDER — QUETIAPINE FUMARATE 50 MG/1
50 TABLET, FILM COATED ORAL NIGHTLY PRN
COMMUNITY

## 2020-03-07 RX ORDER — TIZANIDINE 4 MG/1
4 TABLET ORAL 2 TIMES DAILY PRN
COMMUNITY

## 2020-03-07 RX ORDER — ALPRAZOLAM 2 MG/1
2 TABLET ORAL 3 TIMES DAILY PRN
Status: ON HOLD | COMMUNITY
End: 2020-03-09 | Stop reason: HOSPADM

## 2020-03-07 RX ORDER — TRAZODONE HYDROCHLORIDE 50 MG/1
50 TABLET ORAL NIGHTLY
Status: DISCONTINUED | OUTPATIENT
Start: 2020-03-07 | End: 2020-03-08

## 2020-03-07 RX ADMIN — HYDROXYZINE HYDROCHLORIDE 25 MG: 25 TABLET, FILM COATED ORAL at 21:29

## 2020-03-07 RX ADMIN — TRAZODONE HYDROCHLORIDE 50 MG: 50 TABLET ORAL at 21:29

## 2020-03-07 RX ADMIN — MELATONIN 3 MG: at 21:29

## 2020-03-07 RX ADMIN — GABAPENTIN 300 MG: 300 CAPSULE ORAL at 21:29

## 2020-03-07 RX ADMIN — HYDROXYZINE HYDROCHLORIDE 25 MG: 25 TABLET, FILM COATED ORAL at 17:16

## 2020-03-07 ASSESSMENT — SLEEP AND FATIGUE QUESTIONNAIRES
DO YOU HAVE DIFFICULTY SLEEPING: NO
DIFFICULTY FALLING ASLEEP: YES
RESTFUL SLEEP: NO
AVERAGE NUMBER OF SLEEP HOURS: 10
SLEEP PATTERN: RESTLESSNESS;INSOMNIA
DIFFICULTY STAYING ASLEEP: YES
DIFFICULTY ARISING: NO
DO YOU USE A SLEEP AID: YES

## 2020-03-07 ASSESSMENT — ENCOUNTER SYMPTOMS
VOMITING: 0
SHORTNESS OF BREATH: 0
BACK PAIN: 0
RHINORRHEA: 0
NAUSEA: 0
ABDOMINAL PAIN: 0
DIARRHEA: 0
SORE THROAT: 0

## 2020-03-07 ASSESSMENT — PATIENT HEALTH QUESTIONNAIRE - PHQ9: SUM OF ALL RESPONSES TO PHQ QUESTIONS 1-9: 27

## 2020-03-07 ASSESSMENT — LIFESTYLE VARIABLES: HISTORY_ALCOHOL_USE: NO

## 2020-03-07 NOTE — ED NOTES
PT states has been hearing voices and having suicidal thoughts x 1 month. PT states [place].       Karen Olson RN  03/07/20 6052

## 2020-03-07 NOTE — PROGRESS NOTES
[facility] Admission From ED  Nursing Admission Note              Patient Active Problem List   Diagnosis    Cervical facet joint syndrome    Cervical facet joint syndrome       Pt admitted from Dr. Jamel Suarez care in ED to Adult [facility] room # 611. Arrived on unit via [facility] with [address] escort. Pt appropriately attired in hospital gown. Body assessment completed by Gloria Contreras RN AND JUANITA LOUIE with no contraband discovered. All tubes, lines, and drains were appropriately discontinued by ED staff prior to pt transfer to [facility]. Pt belongings and valuables inventoried and cataloged, stored per policy. Pt oriented to surroundings, program expectations, and copy pt rights given. Received admit packet: [address], Visitation Info, Fall Prevention, Restraints Info. Consents reviewed, signed Pt Rights, Handbook Acceptance, Visit/Call Acceptance, PHI Release, Social Info Release, and Treatment Agreement. Pt verbalizes understanding. Identifies stressors. \"LIFE\".          Irving Puentes RN
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
## 2020-03-07 NOTE — PROGRESS NOTES
Disp: , Rfl:     omeprazole (PRILOSEC) 20 MG delayed release capsule, Take 20 mg by mouth daily, Disp: , Rfl:     HYDROcodone-acetaminophen (NORCO)  MG per tablet, Take 1 tablet by mouth 2 times daily as needed for Pain for up to 30 days. Carly Robles Date: 6/27/18, Disp: 60 tablet, Rfl: 0    estradiol (ESTRACE) 1 MG tablet, Take 1 mg by mouth daily, Disp: , Rfl:     promethazine (PHENERGAN) 25 MG tablet, Take 25 mg by mouth every 6 hours as needed for Nausea, Disp: , Rfl:      Mental Status Evaluation:     Appearance:  age appropriate   Behavior:  Within Normal Limits   Speech:  soft   Mood:  anxious and depressed   Affect:   blunted and flat   Thought Process:  circumstantial   Thought Content:  Suicidal and Hallucinations   Sensorium:  person, place, situation, day of week, month of year and year   Cognition:  grossly intact   Insight:  poor       Collateral Information:     Name: Vandana Collazo  Relationship:   Phone Number: [phone]  Collateral:     Disposition:     Choose one of the four options below for   disposition:     1. Decision to admit to [facility]: yes    If yes, which unit Adult or Geriatric Unit:  Adult  Is patient voluntary: yes  If no has a 72 hold been initiated:   Does the patient have a guardian:   Has the guardian been notified:   Admission Diagnosis: Suicidal and Hallucinations    2. Referral to IOP/PHP:      3. Decision to Discharge:   Does not meet criteria for acceptance to   unit due to:     4. Transferred:       Patient was transferred due to:      Other follow up information provided:      Marlo Andrews RN

## 2020-03-07 NOTE — ED NOTES
Pt changed into scrubs and belongings given to pt's aunt to take home. Urine and blood collected and sent to lab.      Jensen Hewitt RN  03/07/20 8327

## 2020-03-08 PROBLEM — F25.0 SCHIZOAFFECTIVE DISORDER, BIPOLAR TYPE (HCC): Status: ACTIVE | Noted: 2020-03-08

## 2020-03-08 LAB — HCG(URINE) PREGNANCY TEST: NEGATIVE

## 2020-03-08 PROCEDURE — 1240000000 HC EMOTIONAL WELLNESS R&B

## 2020-03-08 PROCEDURE — 6370000000 HC RX 637 (ALT 250 FOR IP): Performed by: PSYCHIATRY & NEUROLOGY

## 2020-03-08 PROCEDURE — 99223 1ST HOSP IP/OBS HIGH 75: CPT | Performed by: PSYCHIATRY & NEUROLOGY

## 2020-03-08 PROCEDURE — 84703 CHORIONIC GONADOTROPIN ASSAY: CPT

## 2020-03-08 RX ORDER — TIZANIDINE 4 MG/1
4 TABLET ORAL 2 TIMES DAILY PRN
Status: DISCONTINUED | OUTPATIENT
Start: 2020-03-08 | End: 2020-03-09 | Stop reason: HOSPADM

## 2020-03-08 RX ORDER — CITALOPRAM 20 MG/1
20 TABLET ORAL DAILY
Status: DISCONTINUED | OUTPATIENT
Start: 2020-03-08 | End: 2020-03-09 | Stop reason: HOSPADM

## 2020-03-08 RX ORDER — PANTOPRAZOLE SODIUM 40 MG/1
40 TABLET, DELAYED RELEASE ORAL
Status: DISCONTINUED | OUTPATIENT
Start: 2020-03-09 | End: 2020-03-09 | Stop reason: HOSPADM

## 2020-03-08 RX ORDER — ESTRADIOL 1 MG/1
1 TABLET ORAL DAILY
Status: DISCONTINUED | OUTPATIENT
Start: 2020-03-08 | End: 2020-03-09 | Stop reason: HOSPADM

## 2020-03-08 RX ORDER — DOXEPIN HYDROCHLORIDE 25 MG/1
25 CAPSULE ORAL NIGHTLY
Status: DISCONTINUED | OUTPATIENT
Start: 2020-03-08 | End: 2020-03-09 | Stop reason: HOSPADM

## 2020-03-08 RX ORDER — IBUPROFEN 600 MG/1
600 TABLET ORAL EVERY 6 HOURS PRN
Status: DISCONTINUED | OUTPATIENT
Start: 2020-03-08 | End: 2020-03-09 | Stop reason: HOSPADM

## 2020-03-08 RX ORDER — LANOLIN ALCOHOL/MO/W.PET/CERES
3 CREAM (GRAM) TOPICAL NIGHTLY
Status: DISCONTINUED | OUTPATIENT
Start: 2020-03-08 | End: 2020-03-09 | Stop reason: HOSPADM

## 2020-03-08 RX ORDER — QUETIAPINE FUMARATE 50 MG/1
50 TABLET, FILM COATED ORAL NIGHTLY
Status: DISCONTINUED | OUTPATIENT
Start: 2020-03-08 | End: 2020-03-09 | Stop reason: HOSPADM

## 2020-03-08 RX ADMIN — ARIPIPRAZOLE 5 MG: 5 TABLET ORAL at 08:15

## 2020-03-08 RX ADMIN — GABAPENTIN 300 MG: 300 CAPSULE ORAL at 08:15

## 2020-03-08 RX ADMIN — HYDROXYZINE HYDROCHLORIDE 25 MG: 25 TABLET, FILM COATED ORAL at 06:28

## 2020-03-08 RX ADMIN — DOXEPIN HYDROCHLORIDE 25 MG: 25 CAPSULE ORAL at 20:33

## 2020-03-08 RX ADMIN — GABAPENTIN 300 MG: 300 CAPSULE ORAL at 20:33

## 2020-03-08 RX ADMIN — TIZANIDINE 4 MG: 4 TABLET ORAL at 18:17

## 2020-03-08 RX ADMIN — HYDROXYZINE HYDROCHLORIDE 25 MG: 25 TABLET, FILM COATED ORAL at 20:33

## 2020-03-08 RX ADMIN — ESTRADIOL 1 MG: 1 TABLET ORAL at 20:33

## 2020-03-08 RX ADMIN — ACETAMINOPHEN 650 MG: 325 TABLET ORAL at 04:37

## 2020-03-08 RX ADMIN — MELATONIN 3 MG: at 20:33

## 2020-03-08 RX ADMIN — ACETAMINOPHEN 650 MG: 325 TABLET ORAL at 23:37

## 2020-03-08 RX ADMIN — GABAPENTIN 300 MG: 300 CAPSULE ORAL at 13:49

## 2020-03-08 RX ADMIN — IBUPROFEN 600 MG: 600 TABLET ORAL at 18:17

## 2020-03-08 RX ADMIN — CITALOPRAM HYDROBROMIDE 20 MG: 20 TABLET ORAL at 08:16

## 2020-03-08 RX ADMIN — QUETIAPINE FUMARATE 50 MG: 50 TABLET ORAL at 20:33

## 2020-03-08 RX ADMIN — HYDROXYZINE HYDROCHLORIDE 25 MG: 25 TABLET, FILM COATED ORAL at 11:12

## 2020-03-08 ASSESSMENT — PAIN DESCRIPTION - PAIN TYPE
TYPE: ACUTE PAIN
TYPE: ACUTE PAIN

## 2020-03-08 ASSESSMENT — PAIN SCALES - GENERAL
PAINLEVEL_OUTOF10: 6
PAINLEVEL_OUTOF10: 4
PAINLEVEL_OUTOF10: 6

## 2020-03-08 ASSESSMENT — PAIN DESCRIPTION - DESCRIPTORS
DESCRIPTORS: DISCOMFORT;HEADACHE
DESCRIPTORS: DISCOMFORT;SORE

## 2020-03-08 ASSESSMENT — PAIN - FUNCTIONAL ASSESSMENT
PAIN_FUNCTIONAL_ASSESSMENT: ACTIVITIES ARE NOT PREVENTED
PAIN_FUNCTIONAL_ASSESSMENT: ACTIVITIES ARE NOT PREVENTED

## 2020-03-08 ASSESSMENT — PAIN DESCRIPTION - ONSET
ONSET: AWAKENED FROM SLEEP
ONSET: AWAKENED FROM SLEEP

## 2020-03-08 ASSESSMENT — PAIN DESCRIPTION - FREQUENCY
FREQUENCY: INTERMITTENT
FREQUENCY: INTERMITTENT

## 2020-03-08 ASSESSMENT — PAIN DESCRIPTION - ORIENTATION
ORIENTATION: MID
ORIENTATION: MID

## 2020-03-08 ASSESSMENT — PAIN DESCRIPTION - PROGRESSION
CLINICAL_PROGRESSION: NOT CHANGED
CLINICAL_PROGRESSION: GRADUALLY WORSENING

## 2020-03-08 ASSESSMENT — PAIN DESCRIPTION - LOCATION
LOCATION: HEAD
LOCATION: NECK

## 2020-03-08 NOTE — PROGRESS NOTES
Group Therapy Note    Start Time: 800  End Time:  123  Number of Participants: 14    Type of Group: Community Meeting       Patient's Goal:  \"going home)      Notes:      Participation Level:  Active Listener       Participation Quality: Appropriate      Thought Process/Content: Logical      Affective Functioning: Congruent      Mood: calm      Level of consciousness:  Alert      Modes of Intervention: Support      Discipline Responsible: Behavioral Health Tech II      Signature:  Angle Hammond

## 2020-03-08 NOTE — PROGRESS NOTES
Progress Note  Mary Jane Vela  3/8/2020 6:10 PM  Subjective:   Admit Date:   3/7/2020      CC/ADMIT DX:       Interval History:   Reviewed overnight events and nursing notes. No new physical complaints. I have reviewed all labs/diagnostics from the last 24hrs. ROS:   I have done a 10 point ROS and all are negative, except what is mentioned in the HPI. DIET GENERAL;    Medications:      citalopram  20 mg Oral Daily    doxepin  25 mg Oral Nightly    melatonin  3 mg Oral Nightly    QUEtiapine  50 mg Oral Nightly    [START ON 3/9/2020] pantoprazole  40 mg Oral QAM AC    estradiol  1 mg Oral Daily    ARIPiprazole  5 mg Oral Daily    gabapentin  300 mg Oral TID           Objective:   Vitals: /70   Pulse 79   Temp 97 °F (36.1 °C) (Temporal)   Resp 14   Ht 5' 9\" (1.753 m)   Wt 224 lb (101.6 kg)   SpO2 100%   BMI 33.08 kg/m²  No intake or output data in the 24 hours ending 03/08/20 1810  General appearance: alert and cooperative with exam  Lungs: clear to auscultation bilaterally  Heart: RRR  Abdomen: soft, non-tender; bowel sounds normal; no masses,  no organomegaly  Extremities: extremities normal, atraumatic, no cyanosis or edema  Neurologic:  No obvious focal neurologic deficits. Assessment and Plan: Active Problems:    Schizoaffective disorder, bipolar type ([address]  Resolved Problems:    * No resolved hospital problems. *      Plan:  1. Continue present medication(s)   2. She is medically stable. I will monitor for any changes or concerns. 3.  Follow with Psych      Discharge planning:   her home     Reviewed treatment plans with the patient and/or family.              Electronically signed by Naveed Hutchison MD on 3/8/2020 at 6:10 PM

## 2020-03-08 NOTE — PROGRESS NOTES
Group Therapy Note    Start Time: 215  End Time:  230  Number of Participants: 10    Type of Group: recreation group       Patient's Goal:        Notes:  Healthy eating    Participation Level:  Active Listener       Participation Quality: Appropriate      Thought Process/Content: Logical      Affective Functioning: Congruent      Mood: calm      Level of consciousness:  Alert      Modes of Intervention: Support      Discipline Responsible: Behavioral Health Tech II      Signature:  Natalie Castillo
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
## 2020-03-08 NOTE — PROGRESS NOTES
Treatment Team Note:     EDUARDO met with [place] team to discuss Pts [address] plans. Progress/Behavior/Group Attendance: DOROTHY     Target Symptoms/Reason for admission: Pt is a 38 yr old female who presented to the emergency department with suicidal ideation and plan to cut her throat. Pt has a hx of Bipolar and Schizophrenia and reported that she is currently taking Abilify. Pt reported a hx of overdose and said she took 80 Xanax over the course of 3 days back in January. Pt stated that she has been hearing voices, is depressed, anxious, and suicidal lately. Pt has never been evaluated here in the past. Pt went to [facility] for 28 days and has only been out of 2 weeks. Diagnoses: Depression with SI; Anxiety, Auditory Hallucinations. UDS: Negative  BAL: Negative <10      AftercarePlan: [facility] in [place], [place] and Dr. Cas Benitez     Pt lives with: her  and 3 children currently. Collateral obtained from: EDUARDO will meet with pt to gather release of information.   On:     Family Session: DREW     Misc:

## 2020-03-08 NOTE — PROGRESS NOTES
Signed          CSW completed psychosocial and CSSR-S on this date. Pt long and short term goals discussed. Pt voiced understanding. Treatment plan sheet signed. Pt verbalized understanding of the treatment plan. Pt participated in goals and objectives of the treatment plan. Completed safety plan with pt and pt received copy of safety plan. Safety plan placed in chart. It was identified that pt will require outpatient follow up appointments at local community behavioral health facility such as; [facility] [address] . Pt validated need for appointments. Pt was also provided a handout of contact information for drug and alcohol treatment centers and other community support service such as [facility] and [facility]. In the last 6 months has the pt been danger to self: YES  In the last 6 months has the pt been danger to others:  NO     Provided pt with [facility] management Online handout entitled \"Quitting Smoking,\" reviewed handout with pt addressing dangers of smoking, developing coping skills, and providing basic information about quitting. Patient declined practical counseling of tobacco practical counseling during the hospital stay.

## 2020-03-08 NOTE — PROGRESS NOTES
[facility] Adult Unit Daily Assessment  Nursing Progress Note    Room: [facility]-01   Name: Kulwinder Carpio   Age: 38 y.o. Gender: female   Dx: <principal problem not specified>  Precautions: suicide risk  Inpatient Status: voluntary       SLEEP:    Sleep Quality Fair  Sleep Medications: Yes   PRN Sleep Meds: Yes       MEDICAL:    Other PRN Meds: Yes   Med Compliant: Yes  Accu-Chek: No  Oxygen/CPAP/BiPAP: No  CIWA/CINA: No   PAIN Assessment: none  Side Effects from medication: none reported      PSYCH:    Depression: \"I'm okay\"   Anxiety: 5   SI denies suicidal ideation   HI Negative for homicidal ideation      AVH:Absent      GENERAL:    Appetite: decreased    Social: No   Speech: normal   Appearance: appropriately dressed and disheveled        Notes: Pt calm and cooperative, A&Ox4 at med pass. Pt requesting her regular medications, was cooperative with meds this evening after education on them. Pt in bed the majority of the evening.          Electronically signed by Maicol Shi RN on 3/8/20 at 12:17 AM CST

## 2020-03-08 NOTE — PROGRESS NOTES
Patient is hallucinating talking to people who arent there. Pacing halls and reaching at the air towards nothing.

## 2020-03-09 VITALS
HEART RATE: 72 BPM | DIASTOLIC BLOOD PRESSURE: 79 MMHG | HEIGHT: 69 IN | RESPIRATION RATE: 18 BRPM | BODY MASS INDEX: 33.18 KG/M2 | SYSTOLIC BLOOD PRESSURE: 142 MMHG | TEMPERATURE: 97.4 F | OXYGEN SATURATION: 99 % | WEIGHT: 224 LBS

## 2020-03-09 LAB
T4 FREE: 0.83 NG/DL (ref 0.93–1.7)
TSH REFLEX FT4: 6.37 UIU/ML (ref 0.35–5.5)
VITAMIN B-12: 333 PG/ML (ref 211–946)
VITAMIN D 25-HYDROXY: 26.8 NG/ML

## 2020-03-09 PROCEDURE — 99238 HOSP IP/OBS DSCHRG MGMT 30/<: CPT | Performed by: PSYCHIATRY & NEUROLOGY

## 2020-03-09 PROCEDURE — 84443 ASSAY THYROID STIM HORMONE: CPT

## 2020-03-09 PROCEDURE — 36415 COLL VENOUS BLD VENIPUNCTURE: CPT

## 2020-03-09 PROCEDURE — 6370000000 HC RX 637 (ALT 250 FOR IP): Performed by: PSYCHIATRY & NEUROLOGY

## 2020-03-09 PROCEDURE — 84439 ASSAY OF FREE THYROXINE: CPT

## 2020-03-09 PROCEDURE — 82607 VITAMIN B-12: CPT

## 2020-03-09 PROCEDURE — 6370000000 HC RX 637 (ALT 250 FOR IP): Performed by: FAMILY MEDICINE

## 2020-03-09 PROCEDURE — 82306 VITAMIN D 25 HYDROXY: CPT

## 2020-03-09 PROCEDURE — 5130000000 HC BRIDGE APPOINTMENT

## 2020-03-09 RX ORDER — ERGOCALCIFEROL 1.25 MG/1
50000 CAPSULE ORAL WEEKLY
Qty: 11 CAPSULE | Refills: 0 | Status: SHIPPED | OUTPATIENT
Start: 2020-03-09 | End: 2020-05-19

## 2020-03-09 RX ORDER — LEVOTHYROXINE SODIUM 0.03 MG/1
25 TABLET ORAL DAILY
Qty: 30 TABLET | Refills: 0 | Status: SHIPPED | OUTPATIENT
Start: 2020-03-09

## 2020-03-09 RX ORDER — CHOLECALCIFEROL (VITAMIN D3) 125 MCG
500 CAPSULE ORAL DAILY
Status: DISCONTINUED | OUTPATIENT
Start: 2020-03-09 | End: 2020-03-09 | Stop reason: HOSPADM

## 2020-03-09 RX ORDER — LEVOTHYROXINE SODIUM 0.03 MG/1
25 TABLET ORAL DAILY
Status: DISCONTINUED | OUTPATIENT
Start: 2020-03-09 | End: 2020-03-09 | Stop reason: HOSPADM

## 2020-03-09 RX ORDER — ERGOCALCIFEROL 1.25 MG/1
50000 CAPSULE ORAL WEEKLY
Status: DISCONTINUED | OUTPATIENT
Start: 2020-03-09 | End: 2020-03-09 | Stop reason: HOSPADM

## 2020-03-09 RX ORDER — MELOXICAM 7.5 MG/1
7.5 TABLET ORAL DAILY
Qty: 30 TABLET | Refills: 2 | Status: SHIPPED | OUTPATIENT
Start: 2020-03-09

## 2020-03-09 RX ADMIN — ARIPIPRAZOLE 5 MG: 5 TABLET ORAL at 09:15

## 2020-03-09 RX ADMIN — HYDROXYZINE HYDROCHLORIDE 25 MG: 25 TABLET, FILM COATED ORAL at 08:44

## 2020-03-09 RX ADMIN — GABAPENTIN 300 MG: 300 CAPSULE ORAL at 13:31

## 2020-03-09 RX ADMIN — PANTOPRAZOLE SODIUM 40 MG: 40 TABLET, DELAYED RELEASE ORAL at 05:43

## 2020-03-09 RX ADMIN — ERGOCALCIFEROL 50000 UNITS: 1.25 CAPSULE, LIQUID FILLED ORAL at 14:05

## 2020-03-09 RX ADMIN — CITALOPRAM HYDROBROMIDE 20 MG: 20 TABLET ORAL at 09:15

## 2020-03-09 RX ADMIN — LEVOTHYROXINE SODIUM 25 MCG: 25 TABLET ORAL at 14:04

## 2020-03-09 RX ADMIN — GABAPENTIN 300 MG: 300 CAPSULE ORAL at 09:15

## 2020-03-09 RX ADMIN — IBUPROFEN 600 MG: 600 TABLET ORAL at 03:05

## 2020-03-09 RX ADMIN — CYANOCOBALAMIN TAB 500 MCG 500 MCG: 500 TAB at 14:05

## 2020-03-09 RX ADMIN — ACETAMINOPHEN 650 MG: 325 TABLET ORAL at 14:05

## 2020-03-09 RX ADMIN — ESTRADIOL 1 MG: 1 TABLET ORAL at 09:15

## 2020-03-09 RX ADMIN — HYDROXYZINE HYDROCHLORIDE 25 MG: 25 TABLET, FILM COATED ORAL at 04:43

## 2020-03-09 ASSESSMENT — PAIN SCALES - GENERAL
PAINLEVEL_OUTOF10: 2
PAINLEVEL_OUTOF10: 5
PAINLEVEL_OUTOF10: 4

## 2020-03-09 NOTE — PLAN OF CARE
Problem: Altered Mood, Depressive Behavior:  Goal: Able to verbalize acceptance of life and situations over which he or she has no control  Description: Able to verbalize acceptance of life and situations over which he or she has no control  3/9/2020 1358 by Ramon Heart  Outcome: Ongoing  Note:                                                                     Group Therapy Note    Date: 3/9/2020  Start Time: 1000  End Time:  1045  Number of Participants: 8    Type of Group: Psychoeducation    Wellness Binder Information  Module Name:  Relapse Prevention  Session Number:  5  Group Goal for Pt: To improve knowledge of relapse prevention strategies  Notes:  Pt demonstrated improved knowledge of relapse prevention strategies by actively participating in group discussion.     Status After Intervention:  Unchanged    Participation Level: Interactive    Participation Quality: Attentive      Speech:  normal      Thought Process/Content: Logical      Affective Functioning: Congruent      Mood: anxious and depressed      Level of consciousness:  Alert and Oriented x4      Response to Learning: Able to verbalize current knowledge/experience, Able to verbalize/acknowledge new learning, and Progressing to goal      Endings: None Reported    Modes of Intervention: Reality-testing      Discipline Responsible: Psychoeducational Specialist      Signature:  Ramon Heart

## 2020-03-09 NOTE — PLAN OF CARE
Problem: Mood - Altered:  Goal: Mood stable  Description: Mood stable  3/9/2020 1541 by Lashay Suarez RN  Outcome: Completed  3/9/2020 1013 by Lashay Suarez RN  Outcome: Ongoing     Problem: Altered Mood, Depressive Behavior:  Goal: Able to verbalize acceptance of life and situations over which he or she has no control  Description: Able to verbalize acceptance of life and situations over which he or she has no control  3/9/2020 1541 by Lashay Suarez RN  Outcome: Completed  3/9/2020 1358 by Mell Funez  Outcome: Ongoing  Note:                                                                     Group Therapy Note    Date: 3/9/2020  Start Time: 1000  End Time:  1045  Number of Participants: 8    Type of Group: Psychoeducation    Wellness Binder Information  Module Name:  Relapse Prevention  Session Number:  5  Group Goal for Pt: To improve knowledge of relapse prevention strategies  Notes:  Pt demonstrated improved knowledge of relapse prevention strategies by actively participating in group discussion.     Status After Intervention:  Unchanged    Participation Level: Interactive    Participation Quality: Attentive      Speech:  normal      Thought Process/Content: Logical      Affective Functioning: Congruent      Mood: anxious and depressed      Level of consciousness:  Alert and Oriented x4      Response to Learning: Able to verbalize current knowledge/experience, Able to verbalize/acknowledge new learning, and Progressing to goal      Endings: None Reported    Modes of Intervention: Reality-testing      Discipline Responsible: Psychoeducational Specialist      Signature:  Mell Funez    3/9/2020 1201 by Fly Velez  Outcome: Ongoing  3/9/2020 1013 by Lashay Suarez RN  Outcome: Ongoing  Goal: Able to verbalize and/or display a decrease in depressive symptoms  Description: Able to verbalize and/or display a decrease in depressive symptoms  3/9/2020 1541 by Lashay Suarez RN  Outcome: Completed  3/9/2020 1013 by Fredi Alan RN  Outcome: Ongoing  Goal: Ability to disclose and discuss suicidal ideas will improve  Description: Ability to disclose and discuss suicidal ideas will improve  3/9/2020 1541 by Fredi Alan RN  Outcome: Completed  3/9/2020 1013 by Fredi Alan RN  Outcome: Ongoing  Goal: Able to verbalize support systems  Description: Able to verbalize support systems  3/9/2020 1541 by Fredi Alan RN  Outcome: Completed  3/9/2020 1013 by Fredi Alan RN  Outcome: Ongoing  Goal: Absence of self-harm  Description: Absence of self-harm  3/9/2020 1541 by Fredi Alan RN  Outcome: Completed  3/9/2020 1013 by Fredi Alan RN  Outcome: Ongoing  Goal: Patient specific goal  Description: Patient specific goal  3/9/2020 1541 by Fredi Alan RN  Outcome: Completed  3/9/2020 1013 by Fredi Alan RN  Outcome: Ongoing  Goal: Participates in care planning  Description: Participates in care planning  3/9/2020 1541 by Fredi Alan RN  Outcome: Completed  3/9/2020 1013 by Fredi Alan RN  Outcome: Ongoing     Problem: Suicide risk  Goal: Provide patient with safe environment  Description: Provide patient with safe environment  3/9/2020 1541 by Fredi Alan RN  Outcome: Completed  3/9/2020 1013 by Fredi Alan RN  Outcome: Ongoing

## 2020-03-09 NOTE — PROGRESS NOTES
Educated family related to potential risks involved with leaving AMA. Family verbalized understanding.

## 2020-03-09 NOTE — PLAN OF CARE
Problem: Altered Mood, Depressive Behavior:  Goal: Able to verbalize and/or display a decrease in depressive symptoms  3/9/2020 1612 by Rach Graves  Outcome: Ongoing     Group Therapy Note     Date: 3/9/2020  Start Time: 8544  End Time:  1600  Number of Participants: 9     Type of Group: Recovery     Wellness Binder Information  Module Name:  Relapse prevention  Session Number:  2     Patient's Goal:  identifying early warning signs     Notes:  pt acknowledged negative thinking as an early warning sign to help prevent relapse.      Status After Intervention:  Improved     Participation Level: Interactive     Participation Quality: Appropriate, Attentive, and Sharing        Speech:  normal        Thought Process/Content: Logical        Affective Functioning: Congruent        Mood: congruent        Level of consciousness:  Alert, Oriented x4, and Attentive        Response to Learning: Able to verbalize current knowledge/experience        Endings: None Reported     Modes of Intervention: Education        Discipline Responsible: Psychoeducational Specialist        Signature:  Rach Graves

## 2020-03-09 NOTE — PROGRESS NOTES
Medications:   Medication Summary Provided. I understand that I should take only the medications on my list.   --why and when I need to take each medication. --which side effects to watch for.   --that I should carry my medication information at all times in case of emergency    Situations. --I will take all medications until discontinued by physician. I will take all my medications to follow up appointments. --check with my physician or pharmacist before taking any new medication, over    the counter product or drink alcohol.   --ask about food, drug and dietary supplement interactions. --discard old lists and update records with medication providers. Behavior Health Follow Up:  Original Referral Source: ED  Discharge Diagnosis:   Patient Active Problem List   Diagnosis    Cervical facet joint syndrome    Cervical facet joint syndrome    Schizoaffective disorder, bipolar type ([facility] [address]     Recomendations for Level of Care: Follow Up  Patient Status at Discharge: Stable  [facility]  was: 1600   Aftercare plan faxed:    Faxed by: Social Work staff   Date: 20   Time:   Prescriptions sent with pt.  E-script    General Information:   Questions regarding your bill: Call [phone]   Suicide Hotline ([facility]) 2-760.675.9119   To obtain results of pending studies call Medical Records at: [phone]   For emergencies and 24 hour/7 days a week contact information: [phone]

## 2020-03-09 NOTE — PROGRESS NOTES
Patient reports improved mood. Appetite is 100% at breakfast. She did not bathe today. She is social with peers and staff. She is asking frequently about discharge. She is dressed in street clothes and hygiene is good. She denies any thoughts of self harm. She denies any homicidal ideation. She rated depression and anxiety 0 on a scale of 1-10. She reports she has an appointment with Dr. Dario Lindsay on 3/25/2020 at 10:00 AM and Joey Loza in [place] on Monday, Wednesday and Friday. She has stayed in the milieu for most of the day, mostly being at the desk asking about her discharge. She met with her provider and patient is discharged AMA. RELEASE FROM RESPONSIBILITY FOR DISCHARGE IS SIGNED. FOLLOW UP APPOINTMENTS ARE MADE FOR DR. HUGHES AND THOMAS MITCHELL. VIT B 12, SYNTHROID, MELOXICAM, AND VITAMIN D E-SCRIPT TO [facility] IN [facility] UNDERSTANDS ALL DISCHARGE INSTRUCTIONS PRIOR TO LEAVING THE UNIT. SHE IS BRIGHT AT DISCHARGE AND IS LAUGHING AND TALKING TO STAFF AND OTHER PATIENTS. PATIENT IS DISCHARGED TO THE [facility] AND MET WITH FAMILY THERE.  THERE ARE NO VALUABLES OR HOME MEDICATIONS TO RETURN

## 2020-03-09 NOTE — PLAN OF CARE
Problem: Altered Mood, Depressive Behavior:  Goal: Able to verbalize acceptance of life and situations over which he or she has no control  3/9/2020 1201 by Christofer Strange  Outcome: Ongoing      Group Therapy Note     Date: 3/9/2020  Start Time: 1100  End Time:  8894  Number of Participants: 9     Type of Group: Psychotherapy     Wellness Binder Information  Module Name:  emotional wellness  Session Number:  5     Patient's Goal:  obstacles to emotional wellness     Notes:  pt acknowledged negative thinking as an obstacle to emotional wellness.      Status After Intervention:  Improved     Participation Level: Interactive     Participation Quality: Appropriate, Attentive, and Sharing        Speech:  normal        Thought Process/Content: Logical        Affective Functioning: Congruent        Mood: congruent        Level of consciousness:  Alert, Oriented x4, and Attentive        Response to Learning: Able to verbalize current knowledge/experience        Endings: None Reported     Modes of Intervention: Education        Discipline Responsible: Psychoeducational Specialist        Signature:  Christofer Strange

## 2020-03-09 NOTE — PROGRESS NOTES
GOOD)  Motor Activity:Normal: Yes  Motor Activity: Increased  Interview Behavior: Cooperative, Irritable  Preception: [place] to Person, [place] Fossa to Time, [place] to Place, [place] to Situation  Attention:Normal: Yes  Attention: (ADEQUATE)  Thought Processes: Circumstantial  Thought Content:Normal: Yes  Thought Content: (DENIES ANY SUICIDAL IDEATIONS)  Hallucinations: None  Delusions: No  Memory:Normal: Yes(INTACT FOR RECENT AND REMOTE)  Insight and Judgment: No  Insight and Judgment: (IMPAIRED INSIGHT AND JUDGMENT)  Present Suicidal Ideation: No  Present Homicidal Ideation: No

## 2020-03-09 NOTE — PROGRESS NOTES
Group Therapy Note    Start Time: 945  End Time:  1000  Number of Participants: 12    Type of Group: Community Meeting       Patient's Goal:  Going home      Notes:      Participation Level:  Active Listener       Participation Quality: Appropriate      Thought Process/Content: Logical      Affective Functioning: Congruent      Mood: calm      Level of consciousness:  Alert      Modes of Intervention: Support      Discipline Responsible: Behavioral Health Tech II      Signature:  Sujatha Kenyon

## 2020-03-09 NOTE — DISCHARGE SUMMARY
Discharge Summary     Patient ID:  Susanna Dial  [id number]  32 y.o.  1980    Admit date: 3/7/2020  Discharge date: 3/9/2020    Admitting Physician: Rajwinder Callejas MD   Attending Physician: Shanta att. providers found  Discharge Provider: Amber Simmons     Admission Diagnoses: Schizoaffective disorder, bipolar type ([facility] [address] [F25.0]    Discharge Diagnoses: Schizoaffective disorder, bipolar type  Suicidal ideations  Chronic pain syndrome   Insomnia  Vitamin B12 deficiency  Vitamin D deficiency    Admission Condition: poor    Discharged Condition: poor    Indication for Admission: Anxiety, depression, auditory hallucinations, suicidal ideations    HPI:  The patient is a 38 y.o. female with previous psychiatric history of bipolar disorder and schizophrenia, who has been admitted to psychiatric unit secondary to feeling of anxiety, depression, experiencing hallucinations and suicidal ideations with plan to cut her throat.     Patient has been seen in treatment team room today. She reported that she has difficulties to manage her anxiety and depression, then she started to hear voices during the last month, which she described, as multiple unfamiliar voices, which were telling her that she is [place] bad person and not a good mother\". Patient stated that she experienced command hallucinations, reported that voices were telling her to kill herself. Patient reported that she tried to commit suicide by killing herself in January 23rd, 2020 by overdose of 90 pills of Xanax during the 3 days time. After that she was admitted to rehab treatment for 1 month, which she finished on February 23.     Patient reported that she saw Dr. Desai Shells day before yesterday and who advised patient to come to emergency department for evaluation, and at the same time patient has been started on 5 mg of Abilify daily for psychosis and mood stabilization.     Today during the interview, patient reported that her condition mildly improved since yesterday. She stated that she still experiencing feeling of depression and anxiety, poor quality of sleep, poor motivation, poor concentration, decreased pleasure in previously enjoyed activities. Patient continues to report current active suicidal ideations, but denies having any suicidal plans anymore. Also, she denies any homicidal ideations. Patient denies having any auditory and visual hallucinations at time of interview, and stated that last time when she experienced auditory hallucinations is just before her admission to the hospital yesterday. She reported treatment compliance with prescribed psychotropic medications.     PSYCHIATRIC HISTORY:    Diagnoses: Bipolar disorder, schizophrenia  Suicide attempts/gestures: In January 2020 by overdose of 90 Xanax during the 3 days. Prior hospitalizations: Denies   Medication trials: Abilify, Celexa, doxepin, Seroquel, Klonopin, Xanax, Geodon, Ambien, [place] [facility] Course:   Patient was admitted to the adult psych behavioral health floor and was acclimated to the floor. Labs were reviewed and physical exam was completed by Dr. Artemio Galaviz and associates. Home medications were reconciled. TAMMI was obtained and reviewed. Medication changes were made and patient tolerated well with no side effects. During the hospital stay patient's home medications have been restarted at previously prescribed and recommended dose, as patient declined to consider any psychotropic medication changes. While in the hospital, patient has been diagnosed with vitamin D deficiency and vitamin B-12 deficiency, and she has been started on vitamin D 51302 units weekly and vitamin B12 500 MCG's daily. Patient attended and participated in groups. The patient did interact well with other patients and staff on the unit. Behaviorally she was not a problem. Patient was compliant with her medications. Patient was sleeping through the night.    On 03/09/2020 patient expressed the desire donna discharged from the hospital [address] Some of the patient's family members expressed concern about premature discharge from the hospital due to patient's recent suicidal threats at home. However patient's  came to the hospital with the same request to discharge his wife home today. Patient's  and patient's daughter have been informed that patient is at high risk of committing suicide and it is unsafe to discharge patient home today, due to patient's current behavioral instability, recent suicidal attempt, recent suicidal thoughts with multiple different suicidal plans. Despite the provided information, patient's  requested to discharge patient home today [address] and he signed the papers that he will take full responsibility of taking care of his wife after discharge, will provide safe and stable environmental for his wife at home, and will not pursue any legal actions against hospital or this physician, in case if patient will commit suicide while she is at home. The patient denies any suicidal or homicidal ideations or psychotic symptoms at time of discharge. On 03/09/2020 it was therefore decided to discharge the patient, patient's request and request of the patient's family [address] Number of antipsychotic medication prescribed at discharge: No medications changes have been made during this hospital admission, ad patient declined to consider any psychotropic medication changes.   IF MORE THAN ONE IS USED: NA    History of greater than 3 failed multiple monotherapy trials: NA  Monotherapy taper plan/ cross taper in progress: NA  Augmentation of Clozapine: NA    Referral to addiction treatment: NA    Prescription for Alcohol or Drug Disorder Medication: NA    Prescription for Tobacco Cessation medication: NA    If no prescriptions for Tobacco Cessation must document why: NA    Consults: Internal medicine    Significant Diagnostic Studies:   Recent Labs fair    Electronically signed by Kalpana Holcomb MD on 3/9/2020 at 2:29 PM
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
## 2020-03-11 NOTE — PROGRESS NOTES
Progress Note  Suzon Schwab  3/11/2020 9:19 AM  Subjective:   Admit Date:   3/7/2020      CC/ADMIT DX:       Interval History:   Reviewed overnight events and nursing notes. She has no new medical issues. I have reviewed all labs/diagnostics from the last 24hrs. ROS:   I have done a 10 point ROS and all are negative, except what is mentioned in the HPI. No diet orders on file    Medications:             Objective:   Vitals: BP (!) 142/79   Pulse 72   Temp 97.4 °F (36.3 °C) (Temporal)   Resp 18   Ht 5' 9\" (1.753 m)   Wt 224 lb (101.6 kg)   SpO2 99%   BMI 33.08 kg/m²  No intake or output data in the 24 hours ending 03/11/20 0919  General appearance: alert and cooperative with exam  Lungs: clear to auscultation bilaterally  Heart: RRR  Abdomen: soft, non-tender; bowel sounds normal; no masses,  no organomegaly  Extremities: extremities normal, atraumatic, no cyanosis or edema  Neurologic:  No obvious focal neurologic deficits. Assessment and Plan: Active Problems:    Schizoaffective disorder, bipolar type ([facility] [address]  Resolved Problems:    * No resolved hospital problems. *    Vit D Def    Hypothyroidism    Plan:  1. Continue present medication(s)   2. Replace Vit D  3. Start Synthroid, d/w pt importance of rechecking labs in 4 weeks   4. Follow with Psych      Discharge planning:   her home     Reviewed treatment plans with the patient and/or family.              Electronically signed by Sundar Wolff MD on 3/11/2020 at 9:19 AM

## 2020-11-13 NOTE — PROGRESS NOTES
it is unsafe to discharge patient home today. Despite provided information about patient's current mental health condition, her  requested to discharge patient home today, he signed the paper that he will take full responsibility by taking care of patient, his wife, at home and will not pursue any legal actions against hospital or this physician, in case if patient will commit suicide, while she is at home. OBJECTIVE    Physical  VITALS:  BP (!) 142/79   Pulse 72   Temp 97.4 °F (36.3 °C) (Temporal)   Resp 18   Ht 5' 9\" (1.753 m)   Wt 224 lb (101.6 kg)   SpO2 99%   BMI 33.08 kg/m²   TEMPERATURE:  Current - Temp: 97.4 °F (36.3 °C); Max - Temp  Av.7 °F (36.5 °C)  Min: 97.4 °F (36.3 °C)  Max: 97.9 °F (36.6 °C)  RESPIRATIONS RANGE: Resp  Av  Min: 18  Max: 20  PULSE RANGE: Pulse  Av.5  Min: 72  Max: 87  BLOOD PRESSURE RANGE:  Systolic (07WWY), IKZ:125 , Min:123 , HVK:165   ; Diastolic (25GNA), SLL:42, Min:73, Max:79    Review of Systems: 14 point review of systems is negative    Psychological ROS: Negative    Mental Status Examination:  Appearance: Appropriately groomed, wearing casual civilian clothes. Made intermittent eye contact. Behavior: Anxious, irritated, resistant, partially cooperative with interview. No psychomotor retardation or agitation appreciated. Gait unremarkable. Speech: Normal in tone, volume, and quality. Mood: \"Good\"   Affect: Mood congruent. Range is restricted. Thought Process: Mostly circumstantial  Thought Content: Patient does not have any current active suicidal and   homicidal ideations. Perceptions: Seems patient does not have any auditory or visual hallucinations at present time. Patient did not appear to be responding to internal stimuli. Orientation: to person, place, date, and situation. Alert. Language: Intact. Fund of information: Intact. Memory: recent and remote appear intact. Impulsivity: Limited.    Neurovegitative: Susana Flynn appetite, fair sleep  Insight: Impaired. Judgment: Impaired. Data    Lab Results   Component Value Date    OKRSBPWX99 333 03/09/2020     Lab Results   Component Value Date    VITD25 26.8 (L) 03/09/2020       Medications  Current Facility-Administered Medications: citalopram (CELEXA) tablet 20 mg, 20 mg, Oral, Daily  doxepin (SINEQUAN) capsule 25 mg, 25 mg, Oral, Nightly  melatonin tablet 3 mg, 3 mg, Oral, Nightly  QUEtiapine (SEROQUEL) tablet 50 mg, 50 mg, Oral, Nightly  pantoprazole (PROTONIX) tablet 40 mg, 40 mg, Oral, QAM AC  tiZANidine (ZANAFLEX) tablet 4 mg, 4 mg, Oral, BID PRN  estradiol (ESTRACE) tablet 1 mg, 1 mg, Oral, Daily  ibuprofen (ADVIL;MOTRIN) tablet 600 mg, 600 mg, Oral, Q6H PRN  acetaminophen (TYLENOL) tablet 650 mg, 650 mg, Oral, Q4H PRN  polyethylene glycol (GLYCOLAX) packet 17 g, 17 g, Oral, Daily PRN  hydrOXYzine (ATARAX) tablet 25 mg, 25 mg, Oral, Q4H PRN  ARIPiprazole (ABILIFY) tablet 5 mg, 5 mg, Oral, Daily  gabapentin (NEURONTIN) capsule 300 mg, 300 mg, Oral, TID    ASSESSMENT AND PLAN    DSM 5 DIAGNOSIS:  Schizoaffective disorder, bipolar type  Suicidal ideations  Chronic pain syndrome   Insomnia       Plan:   1. Psychiatric Medications:   Continue current psychotropic medications as recommended. Patient denies side effect of prescribed medications. She continues to decline any considerations with adjustment of psychotropic medications. 2. Medical Issues:    Continue medical monitoring by Dr. Choco Hebert and associates. 3. Disposition:    Discharge patient home when she will be in stable mental condition and adjustment psychotropic medications will be done.      Amount of time spent with patient:    35 minutes with greater than 50% of the time spent in counseling and collaboration of care Fair

## 2021-12-16 ENCOUNTER — TELEPHONE (OUTPATIENT)
Dept: NEUROSURGERY | Age: 41
End: 2021-12-16

## 2021-12-16 NOTE — TELEPHONE ENCOUNTER
V/m changed appointment date . Due to provider will be out of office. Also sent letter with new appointment time and date .